# Patient Record
Sex: FEMALE | Race: WHITE | NOT HISPANIC OR LATINO | Employment: FULL TIME | URBAN - METROPOLITAN AREA
[De-identification: names, ages, dates, MRNs, and addresses within clinical notes are randomized per-mention and may not be internally consistent; named-entity substitution may affect disease eponyms.]

---

## 2017-06-02 ENCOUNTER — TRANSFERRED RECORDS (OUTPATIENT)
Dept: HEALTH INFORMATION MANAGEMENT | Facility: CLINIC | Age: 57
End: 2017-06-02

## 2017-06-02 LAB
HPV ABSTRACT: NORMAL
PAP-ABSTRACT: NORMAL

## 2017-11-22 ENCOUNTER — TELEPHONE (OUTPATIENT)
Dept: OBGYN | Facility: CLINIC | Age: 57
End: 2017-11-22

## 2017-11-22 ENCOUNTER — OFFICE VISIT (OUTPATIENT)
Dept: OBGYN | Facility: CLINIC | Age: 57
End: 2017-11-22
Payer: COMMERCIAL

## 2017-11-22 VITALS
HEART RATE: 81 BPM | BODY MASS INDEX: 22.03 KG/M2 | DIASTOLIC BLOOD PRESSURE: 70 MMHG | HEIGHT: 60 IN | SYSTOLIC BLOOD PRESSURE: 119 MMHG | WEIGHT: 112.2 LBS

## 2017-11-22 DIAGNOSIS — M25.552 PAIN IN JOINT INVOLVING PELVIC REGION AND THIGH, LEFT: Primary | ICD-10-CM

## 2017-11-22 PROCEDURE — 99203 OFFICE O/P NEW LOW 30 MIN: CPT | Performed by: OBSTETRICS & GYNECOLOGY

## 2017-11-22 RX ORDER — DULOXETIN HYDROCHLORIDE 20 MG/1
20 CAPSULE, DELAYED RELEASE ORAL 2 TIMES DAILY
Qty: 60 CAPSULE | Refills: 1 | Status: SHIPPED | OUTPATIENT
Start: 2017-11-22

## 2017-11-22 RX ORDER — DULOXETIN HYDROCHLORIDE 60 MG/1
60 CAPSULE, DELAYED RELEASE ORAL DAILY
Qty: 90 CAPSULE | Refills: 3 | Status: SHIPPED | OUTPATIENT
Start: 2017-12-22 | End: 2018-03-07
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
## 2017-11-22 NOTE — NURSING NOTE
Chief Complaint   Patient presents with     Consult     discuss prodendro nerve block - ref by Cee Strong (chiropractor)       Initial /70 (BP Location: Right arm, Patient Position: Chair, Cuff Size: Adult Regular)  Pulse 81  Ht 5' (1.524 m)  Wt 112 lb 3.2 oz (50.9 kg)  BMI 21.91 kg/m2 Estimated body mass index is 21.91 kg/(m^2) as calculated from the following:    Height as of this encounter: 5' (1.524 m).    Weight as of this encounter: 112 lb 3.2 oz (50.9 kg).  Medication Reconciliation: complete     Liang Cleary, CMA

## 2017-11-22 NOTE — MR AVS SNAPSHOT
"              After Visit Summary   11/22/2017    Jennifer Hurley    MRN: [id number]           Patient Information     Date Of Birth          1960        Visit Information        Provider Department      11/22/2017 9:30 AM Ave Zendejas MD [facility]        Today's Diagnoses     Pain in joint involving pelvic region and thigh, left    -  1       Follow-ups after your visit        Future tests that were ordered for you today     Open Future Orders        Priority Expected Expires Ordered    Fecal colorectal cancer screen (FIT) Routine 12/13/2017 2/14/2018 11/22/2017            Who to contact     If you have questions or need follow up information about today's clinic visit or your schedule please contact [facility] directly at [phone].  Normal or non-critical lab and imaging results will be communicated to you by MyChart, letter or phone within 4 business days after the clinic has received the results. If you do not hear from us within 7 days, please contact the clinic through MyChart or phone. If you have a critical or abnormal lab result, we will notify you by phone as soon as possible.  Submit refill requests through [facility] or call your pharmacy and they will forward the refill request to us. Please allow 3 business days for your refill to be completed.          Additional Information About Your Visit        MyChart Information     [facility] lets you send messages to your doctor, view your test results, renew your prescriptions, schedule appointments and more. To sign up, go to [email or web address] . Click on \"Log in\" on the left side of the screen, which will take you to the Welcome page. Then click on \"Sign up Now\" on the right side of the page.     You will be asked to enter the access code listed below, as well as some personal information. Please follow the directions to create your username and password.     Your access code is: [email or web address]  Expires: " 2018 10:17 AM     Your access code will  in 90 days. If you need help or a new code, please call your [facility] clinic or [phone].        Care EveryWhere ID     This is your Care EveryWhere ID. This could be used by other organizations to access your [facility] medical records  [id number]        Your Vitals Were     Pulse Height BMI (Body Mass Index)             81 5' (1.524 m) 21.91 kg/m2          Blood Pressure from Last 3 Encounters:   17 119/70   03 102/66   02 92/48    Weight from Last 3 Encounters:   17 112 lb 3.2 oz (50.9 kg)   03 112 lb (50.8 kg)   02 114 lb 8 oz (51.9 kg)                 Today's Medication Changes          These changes are accurate as of: 17 10:17 AM.  If you have any questions, ask your nurse or doctor.               Start taking these medicines.        Dose/Directions    * DULoxetine 20 MG EC capsule   Commonly known as:  CYMBALTA   Used for:  Pain in joint involving pelvic region and thigh, left   Started by:  Ave Zendejas MD        Dose:  20 mg   Take 1 capsule (20 mg) by mouth 2 times daily   Quantity:  60 capsule   Refills:  1       * DULoxetine 60 MG EC capsule   Commonly known as:  CYMBALTA   Used for:  Pain in joint involving pelvic region and thigh, left   Started by:  Ave Zendejas MD        Dose:  60 mg   Start taking on:  2017   Take 1 capsule (60 mg) by mouth daily   Quantity:  90 capsule   Refills:  3       * Notice:  This list has 2 medication(s) that are the same as other medications prescribed for you. Read the directions carefully, and ask your doctor or other care provider to review them with you.         Where to get your medicines      These medications were sent to [facility] [address] [place] [address] [address] [facility]  [address] [facility] [address]     Phone:  [phone]     DULoxetine 20 MG EC capsule    DULoxetine 60 MG EC capsule                Primary  Care Provider    ONCOLOGY       No address on file        Equal Access to Services     SHERRONKANE ALICE : Hadii vee ku naye Jason, wagautamda luqadaha, qalina kaalmamiguel omerdineshmiguel, waxsai warner maynardfredyjany mckeon. So [facility] [phone].    ATENCIÓN: Si habla español, tiene a de la o disposición servicios gratuitos de asistencia lingüística. Llame al [phone].    We comply with applicable federal civil rights laws and [place] laws. We do not discriminate on the basis of race, color, national origin, age, disability, sex, sexual orientation, or gender identity.            Thank you!     Thank you for choosing [facility]  for your care. Our goal is always to provide you with excellent care. Hearing back from our patients is one way we can continue to improve our services. Please take a few minutes to complete the written survey that you may receive in the mail after your visit with us. Thank you!             Your Updated Medication List - Protect others around you: Learn how to safely use, store and throw away your medicines at www.disposemymeds.org.          This list is accurate as of: 11/22/17 10:17 AM.  Always use your most recent med list.                   Brand Name Dispense Instructions for use Diagnosis    * DULoxetine 20 MG EC capsule    CYMBALTA    60 capsule    Take 1 capsule (20 mg) by mouth 2 times daily    Pain in joint involving pelvic region and thigh, left       * DULoxetine 60 MG EC capsule   Start taking on:  12/22/2017    CYMBALTA    90 capsule    Take 1 capsule (60 mg) by mouth daily    Pain in joint involving pelvic region and thigh, left       MULTI-DAY Tabs      qd        PREMPRO PO           ROBITUSSIN A-C OR           TESSALON PERLES PO           UNABLE TO FIND      Ear drops        XANAX 0.25 MG tablet   Generic drug:  ALPRAZolam     15    1- 2 TAB PO TID (Three times per day) as needed for anxiety    Anxiety state, unspecified       * Notice:  This list has 2  medication(s) that are the same as other medications prescribed for you. Read the directions carefully, and ask your doctor or other care provider to review them with you.

## 2018-03-07 DIAGNOSIS — M25.552 PAIN IN JOINT INVOLVING PELVIC REGION AND THIGH, LEFT: ICD-10-CM

## 2018-03-07 RX ORDER — DULOXETIN HYDROCHLORIDE 60 MG/1
60 CAPSULE, DELAYED RELEASE ORAL DAILY
Qty: 90 CAPSULE | Refills: 3 | Status: SHIPPED | OUTPATIENT
Start: 2018-03-07

## 2018-03-07 NOTE — TELEPHONE ENCOUNTER
Jennifer returning call and reports that she has had a significant improvement in her pain since starting the Cymbalta.  She rated her pain a 50/10 prior to starting this medication, today reports that her pain is the worst in the morning which she rates it a 3/10 and later in the day it is 1-2 at times.  She would like one more refill and is hoping that she can go off of this med after this fill.      Felecia Corrales  [facility] RN

## 2018-03-07 NOTE — TELEPHONE ENCOUNTER
"Refill request received from [facility] for Cymbalta. Patient's last office visit with Dr. Zendejas was 11/22/17.     Per that visit:  PLAN:  I discussed with Jennifer, that I cannot locate any areas of tenderness along the pelvic floor or region of Pudendal nerve so I do not believe she is experiencing pudendal neuropathy.  I discussed a trial of Cymbalta to see if can modulate nerve signals; RX given  Ave Zendejas MD  [facility]    Called patient to discuss how pain is doing and how patient is handling medication. Medication noted as \"trial\". Will need to route to provider for advisement on refill following call back.     Alex ROWAN   [facility] RN   "

## 2018-11-26 ENCOUNTER — TELEPHONE (OUTPATIENT)
Dept: OBGYN | Facility: CLINIC | Age: 58
End: 2018-11-26

## 2018-11-26 NOTE — LETTER
November 26, 2018      Jennifer REED LEONEL Xavi  [address] [facility] [address]    Dear MsLuisXavi,      This letter is to remind you that you are due for your mammogram.  Please call [phone] to schedule your appointment at your earliest convenience.     If you have completed the tests outside of [place], please have the results forwarded to our office. We will update the chart for your primary Physician to review before your next annual physical.     Sincerely,      Ave Zendejas MD

## 2018-11-26 NOTE — TELEPHONE ENCOUNTER
Panel Management Review    Date of last visit with a [place] provider: mericle   on 11/22/17  .  Date of next visit with a [place] provider: None.    Health Maintenance List    Health Maintenance   Topic Date Due     PHQ-2 Q1 YR  10/06/1970     HIV SCREEN (SYSTEM ASSIGNED)  10/06/1976     HEPATITIS C SCREENING  10/06/1976     TETANUS IMMUNIZATION (SYSTEM ASSIGNED)  01/13/2007     MAMMO SCREEN Q2 YR (SYSTEM ASSIGNED)  10/06/2008     COLON CANCER SCREEN (SYSTEM ASSIGNED)  10/06/2008     LIPID SCREEN Q5 YR FEMALE (SYSTEM ASSIGNED)  11/20/2008     ADVANCE DIRECTIVE PLANNING Q5 YRS  10/06/2013     INFLUENZA VACCINE (1) 09/01/2018     PAP SCREENING Q3 YR (SYSTEM ASSIGNED)  06/02/2020       Composite cancer screening  Chart review shows that this patient is due/due soon for the following Mammogram  No results found for: PAP  Past Surgical History:   Procedure Laterality Date     C APPENDECTOMY  1974     HC ENLARGE BREAST WITH IMPLANT  1985    silicone     HC REMOVAL OF OVARIAN CYST(S)  1976, 1989     LIGATION OF HEMORRHOID(S)  1980       Is hysterectomy listed in surgical history? No   Is mastectomy listed in surgical history? No     Summary:    Patient is due/failing the following:   Mammogram    Action needed: Patient needs office visit for mammogram.    Type of outreach:  Sent letter.      Staff Signature:  Margareth Sellers

## 2020-07-06 ENCOUNTER — OFFICE VISIT (OUTPATIENT)
Dept: DERMATOLOGY | Facility: CLINIC | Age: 60
End: 2020-07-06
Payer: COMMERCIAL

## 2020-07-06 VITALS — DIASTOLIC BLOOD PRESSURE: 75 MMHG | HEART RATE: 78 BPM | OXYGEN SATURATION: 98 % | SYSTOLIC BLOOD PRESSURE: 142 MMHG

## 2020-07-06 DIAGNOSIS — D48.5 NEOPLASM OF UNCERTAIN BEHAVIOR OF SKIN: Primary | ICD-10-CM

## 2020-07-06 DIAGNOSIS — L57.0 AK (ACTINIC KERATOSIS): ICD-10-CM

## 2020-07-06 DIAGNOSIS — D23.9 DERMAL NEVUS: ICD-10-CM

## 2020-07-06 DIAGNOSIS — D18.01 ANGIOMA OF SKIN: ICD-10-CM

## 2020-07-06 DIAGNOSIS — L82.1 SEBORRHEIC KERATOSIS: ICD-10-CM

## 2020-07-06 DIAGNOSIS — L81.4 LENTIGO: ICD-10-CM

## 2020-07-06 PROCEDURE — 99204 OFFICE O/P NEW MOD 45 MIN: CPT | Mod: 25 | Performed by: DERMATOLOGY

## 2020-07-06 PROCEDURE — 11102 TANGNTL BX SKIN SINGLE LES: CPT | Performed by: DERMATOLOGY

## 2020-07-06 PROCEDURE — 88305 TISSUE EXAM BY PATHOLOGIST: CPT | Mod: TC | Performed by: DERMATOLOGY

## 2020-07-06 RX ORDER — CALCIPOTRIENE 50 UG/G
CREAM TOPICAL
Qty: 60 G | Refills: 3 | Status: SHIPPED | OUTPATIENT
Start: 2020-07-06

## 2020-07-06 RX ORDER — FLUOROURACIL 50 MG/G
CREAM TOPICAL
Qty: 40 G | Refills: 1 | Status: SHIPPED | OUTPATIENT
Start: 2020-07-06

## 2020-07-06 NOTE — PROGRESS NOTES
Jennifer Hurley is a 59 year old year old female patient here today for spots on chest.   .  Patient states this has been present for a while.  Patient reports the following symptoms:  scale.  Patient reports the following previous treatments none.  These treatments did not work.  Patient reports the following modifying factors none.  Associated symptoms: none.  Patient has no other skin complaints today.  Remainder of the HPI, Meds, PMH, Allergies, FH, and SH was reviewed in chart.      Past Medical History:   Diagnosis Date     Unspecified hemorrhoids without mention of complication        Past Surgical History:   Procedure Laterality Date     C APPENDECTOMY  1974     HC ENLARGE BREAST WITH IMPLANT  1985    silicone     HC REMOVAL OF OVARIAN CYST(S)  1976, 1989     LIGATION OF HEMORRHOID(S)  1980        Family History   Problem Relation Age of Onset     Thyroid Disease Mother      Hypertension Father      Circulatory Father      Cancer Maternal Grandmother         liver cancer     Heart Disease Paternal Grandmother         MI     Cerebrovascular Disease Paternal Grandmother         mini-strokes     Cerebrovascular Disease Maternal Grandfather         mini-strokes     Heart Disease Paternal Grandfather        Social History     Socioeconomic History     Marital status:      Spouse name: Filiberto     Number of children: 2     Years of education: 14     Highest education level: Not on file   Occupational History     Occupation:      Employer: [employer],[address] [place] [address]   Social Needs     Financial resource strain: Not on file     Food insecurity     Worry: Not on file     Inability: Not on file     Transportation needs     Medical: Not on file     Non-medical: Not on file   Tobacco Use     Smoking status: Never Smoker     Smokeless tobacco: Never Used   Substance and Sexual Activity     Alcohol use: Yes     Comment: occasional wkend drink     Drug use: Not on file     Sexual  activity: Not on file   Lifestyle     Physical activity     Days per week: Not on file     Minutes per session: Not on file     Stress: Not on file   Relationships     Social connections     Talks on phone: Not on file     Gets together: Not on file     Attends Mu-ism service: Not on file     Active member of club or organization: Not on file     Attends meetings of clubs or organizations: Not on file     Relationship status: Not on file     Intimate partner violence     Fear of current or ex partner: Not on file     Emotionally abused: Not on file     Physically abused: Not on file     Forced sexual activity: Not on file   Other Topics Concern      Service No     Blood Transfusions No     Caffeine Concern No     Occupational Exposure Yes     Hobby Hazards Yes     Sleep Concern Yes     Stress Concern Yes     Weight Concern No     Special Diet No     Back Care No     Exercise Yes     Comment: 5 x weekly     Bike Helmet No     Seat Belt Yes     Self-Exams Yes     Comment: occ     Parent/sibling w/ CABG, MI or angioplasty before 65F 55M? Not Asked   Social History Narrative     Not on file       Outpatient Encounter Medications as of 7/6/2020   Medication Sig Dispense Refill     MULTI-DAY TABS   OR qd       Benzonatate (TESSALON PERLES PO)        Conj Estrog-Medroxyprogest Ace (PREMPRO PO)        DULoxetine (CYMBALTA) 20 MG EC capsule Take 1 capsule (20 mg) by mouth 2 times daily (Patient not taking: Reported on 7/6/2020) 60 capsule 1     DULoxetine (CYMBALTA) 60 MG EC capsule Take 1 capsule (60 mg) by mouth daily (Patient not taking: Reported on 7/6/2020) 90 capsule 3     Guaifenesin-Codeine (ROBITUSSIN A-C OR)        UNABLE TO FIND Ear drops       XANAX 0.25 MG OR TABS 1- 2 TAB PO TID (Three times per day) as needed for anxiety (Patient not taking: No sig reported) 15 0     No facility-administered encounter medications on file as of 7/6/2020.              Review Of Systems  Skin: As above  Eyes:  negative  Ears/Nose/Throat: negative  Respiratory: No shortness of breath, dyspnea on exertion, cough, or hemoptysis  Cardiovascular: negative  Gastrointestinal: negative  Genitourinary: negative  Musculoskeletal: negative  Neurologic: negative  Psychiatric: negative  Hematologic/Lymphatic/Immunologic: negative  Endocrine: negative      O:   NAD, WDWN, Alert & Oriented, Mood & Affect wnl, Vitals stable   Here today alone   BP (!) 142/75   Pulse 78   SpO2 98%    General appearance normal   Vitals stable   Alert, oriented and in no acute distress      Following lymph nodes palpated: Occipital, Cervical, Supraclavicular no lad   L upper back black papule   Chest gritty papules      Stuck on papules and brown macules on trunk and ext   Red papules on trunk  Flesh colored papules on trunk     The remainder of the full exam was normal; the following areas were examined:  conjunctiva/lids, oral mucosa, neck, peripheral vascular system, abdomen, lymph nodes, digits/nails, eccrine and apocrine glands, scalp/hair, face, neck, chest, abdomen, buttocks, back, RUE, LUE, RLE, LLE       Eyes: Conjunctivae/lids:Normal     ENT: Lips, buccal mucosa, tongue: normal    MSK:Normal    Cardiovascular: peripheral edema none    Pulm: Breathing Normal    Lymph Nodes: No Head and Neck Lymphadenopathy     Neuro/Psych: Orientation:Alert and Orientedx3 ; Mood/Affect:normal       A/P:  1. Seborrheic keratosis, lentigo, angioma, dermal nevus  2. L upper back r/o atypical nevus  TANGENTIAL BIOPSY SENT OUT:  After consent, anesthesia with LEC and prep, tangential excision performed and specimen sent out for permanent section histology.  No complications and routine wound care. Patient told to call our office in 1-2 weeks for result.       3. Chest actinic keratosis   Pathophysiology discussed with pateint   Using 5-Flurouracil Cream    5-Fluorouracil (5FU) topical cream (brand names Efudex, Carac) is a prescription topical medicine to treat  actinic keratoses (pre-squamous cell skin cancer lesions), sun-damaged skin as well as superficial skin cancers.    When applied the areas of sun-damaged skin, the 5FU will  find  damaged skin cells and destroy them.   During treatment, the skin will become red and look very irritated. This is the expected  normal response,  Some patients using 5FU show minimal redness and scaling while others have a very  vigorous  response where the skin scabs and peels. The important thing to realize is that 5FU is treating sun-damaged skin that carries skin cancer risk.      While the skin is irritated, open, sore or scabbed you can apply aquaphor, vaseline or 1% hydrocortisone cream in the morning.     You should apply a thin layer of the cream to the affected area twice a day for 2  weeks every night. A strip of cream the length of your finger tip should be enough to cover your entire face.  For tougher skin like arms, legs, or back, we may suggest longer treatment plans.  However if you react really strong and fast, you might stop earlier or use less frequently. - please call if it is very strong and you are concern you might need to stop early.     If you prescription coverage allows we may add calcipotriene (Dovonex ), a vitamin-D derivative, to the treatment plan.  In these cases we will have you mix the calcipotriene with the efudex to help shorten the treatment course and improve outcomes.      Typically very strong reactions are related to lots of underlying sun damage, and this means you are getting a good response to the medication. However, there is no need to be miserable while using this. Please let us know if you are having trouble or concerns!     BENIGN LESIONS DISCUSSED WITH PATIENT:  I discussed the specifics of tumor, prognosis, and genetics of benign lesions.  I explained that treatment of these lesions would be purely cosmetic and not medically neccessary.  I discussed with patient different removal options  including excision, cautery and /or laser.      Nature and genetics of benign skin lesions dicussed with patient.  Signs and Symptoms of skin cancer discussed with patient.  Patient encouraged to perform monthly skin exams.  UV precautions reviewed with patient.  Skin care regimen reviewed with patient: Eliminate harsh soaps, i.e. Dial, zest, irsih spring; Mild soaps such as Cetaphil or Dove sensitive skin, avoid hot or cold showers, aggressive use of emollients including vanicream, cetaphil or cerave discussed with patient.    Risks of non-melanoma skin cancer discussed with patient   Return to clinic 6 months

## 2020-07-06 NOTE — PATIENT INSTRUCTIONS
Wound Care Instructions     FOR SUPERFICIAL WOUNDS     [facility] [phone]    [facility] [phone]                       AFTER 24 HOURS YOU SHOULD REMOVE THE BANDAGE AND BEGIN DAILY DRESSING CHANGES AS FOLLOWS:     1) Remove Dressing.     2) Clean and dry the area with tap water using a Q-tip or sterile gauze pad.     3) Apply Vaseline, Aquaphor, Polysporin ointment or Bacitracin ointment over entire wound.  Do NOT use Neosporin ointment.     4) Cover the wound with a band-aid, or a sterile non-stick gauze pad and micropore paper tape      REPEAT THESE INSTRUCTIONS AT LEAST ONCE A DAY UNTIL THE WOUND HAS COMPLETELY HEALED.    It is an old wives tale that a wound heals better when it is exposed to air and allowed to dry out. The wound will heal faster with a better cosmetic result if it is kept moist with ointment and covered with a bandage.    **Do not let the wound dry out.**      Supplies Needed:      *Cotton tipped applicators (Q-tips)    *Polysporin Ointment or Bacitracin Ointment (NOT NEOSPORIN)    *Band-aids or non-stick gauze pads and micropore paper tape.      PATIENT INFORMATION:    During the healing process you will notice a number of changes. All wounds develop a small halo of redness surrounding the wound.  This means healing is occurring. Severe itching with extensive redness usually indicates sensitivity to the ointment or bandage tape used to dress the wound.  You should call our office if this develops.      Swelling  and/or discoloration around your surgical site is common, particularly when performed around the eye.    All wounds normally drain.  The larger the wound the more drainage there will be.  After 7-10 days, you will notice the wound beginning to shrink and new skin will begin to grow.  The wound is healed when you can see skin has formed over the entire area.  A healed wound has a healthy, shiny look to the surface and is red to dark pink in color  to normalize.  Wounds may take approximately 4-6 weeks to heal.  Larger wounds may take 6-8 weeks.  After the wound is healed you may discontinue dressing changes.    You may experience a sensation of tightness as your wound heals. This is normal and will gradually subside.    Your healed wound may be sensitive to temperature changes. This sensitivity improves with time, but if you re having a lot of discomfort, try to avoid temperature extremes.    Patients frequently experience itching after their wound appears to have healed because of the continue healing under the skin.  Plain Vaseline will help relieve the itching.        POSSIBLE COMPLICATIONS    BLEEDIN. Leave the bandage in place.  2. Use tightly rolled up gauze or a cloth to apply direct pressure over the bandage for 30  minutes.  3. Reapply pressure for an additional 30 minutes if necessary  4. Use additional gauze and tape to maintain pressure once the bleeding has stopped.    Using 5-Flurouracil Cream    5-Fluorouracil (5FU) topical cream (brand names Efudex, Carac) is a prescription topical medicine to treat actinic keratoses (pre-squamous cell skin cancer lesions), sun-damaged skin as well as superficial skin cancers.    When applied the areas of sun-damaged skin, the 5FU will  find  damaged skin cells and destroy them.   During treatment, the skin will become red and look very irritated. This is the expected  normal response,  Some patients using 5FU show minimal redness and scaling while others have a very  vigorous  response where the skin scabs and peels. The important thing to realize is that 5FU is treating sun-damaged skin that carries skin cancer risk.      While the skin is irritated, open, sore or scabbed you can apply aquaphor, vaseline or 1% hydrocortisone cream in the morning.     You should apply a thin layer of the cream to the affected area twice a day for 2  weeks every night. A strip of cream the length of your finger tip  should be enough to cover your entire face.  For tougher skin like arms, legs, or back, we may suggest longer treatment plans.  However if you react really strong and fast, you might stop earlier or use less frequently. - please call if it is very strong and you are concern you might need to stop early.     If you prescription coverage allows we may add calcipotriene (Dovonex ), a vitamin-D derivative, to the treatment plan.  In these cases we will have you mix the calcipotriene with the efudex to help shorten the treatment course and improve outcomes.      Typically very strong reactions are related to lots of underlying sun damage, and this means you are getting a good response to the medication. However, there is no need to be miserable while using this. Please let us know if you are having trouble or concerns!

## 2020-07-06 NOTE — LETTER
7/6/2020         RE: Jennifer Hurley  [address]  [facility] [address]        Dear Colleague,    Thank you for referring your patient, Jennifer Hurley, to the [facility]. Please see a copy of my visit note below.    Jennifer Hurley is a 59 year old year old female patient here today for spots on chest.   .  Patient states this has been present for a while.  Patient reports the following symptoms:  scale.  Patient reports the following previous treatments none.  These treatments did not work.  Patient reports the following modifying factors none.  Associated symptoms: none.  Patient has no other skin complaints today.  Remainder of the HPI, Meds, PMH, Allergies, FH, and SH was reviewed in chart.      Past Medical History:   Diagnosis Date     Unspecified hemorrhoids without mention of complication        Past Surgical History:   Procedure Laterality Date     C APPENDECTOMY  1974     HC ENLARGE BREAST WITH IMPLANT  1985    silicone     HC REMOVAL OF OVARIAN CYST(S)  1976, 1989     LIGATION OF HEMORRHOID(S)  1980        Family History   Problem Relation Age of Onset     Thyroid Disease Mother      Hypertension Father      Circulatory Father      Cancer Maternal Grandmother         liver cancer     Heart Disease Paternal Grandmother         MI     Cerebrovascular Disease Paternal Grandmother         mini-strokes     Cerebrovascular Disease Maternal Grandfather         mini-strokes     Heart Disease Paternal Grandfather        Social History     Socioeconomic History     Marital status:      Spouse name: Filiberto     Number of children: 2     Years of education: 14     Highest education level: Not on file   Occupational History     Occupation:      Employer: [employer],[address] [place] [address]   Social Needs     Financial resource strain: Not on file     Food insecurity     Worry: Not on file     Inability: Not on file     Transportation needs     Medical: Not  on file     Non-medical: Not on file   Tobacco Use     Smoking status: Never Smoker     Smokeless tobacco: Never Used   Substance and Sexual Activity     Alcohol use: Yes     Comment: occasional wkend drink     Drug use: Not on file     Sexual activity: Not on file   Lifestyle     Physical activity     Days per week: Not on file     Minutes per session: Not on file     Stress: Not on file   Relationships     Social connections     Talks on phone: Not on file     Gets together: Not on file     Attends Roman [employer] service: Not on file     Active member of club or organization: Not on file     Attends meetings of clubs or organizations: Not on file     Relationship status: Not on file     Intimate partner violence     Fear of current or ex partner: Not on file     Emotionally abused: Not on file     Physically abused: Not on file     Forced sexual activity: Not on file   Other Topics Concern      Service No     Blood Transfusions No     Caffeine Concern No     Occupational Exposure Yes     Hobby Hazards Yes     Sleep Concern Yes     Stress Concern Yes     Weight Concern No     Special Diet No     Back Care No     Exercise Yes     Comment: 5 x weekly     Bike Helmet No     Seat Belt Yes     Self-Exams Yes     Comment: occ     Parent/sibling w/ CABG, MI or angioplasty before 65F 55M? Not Asked   Social History Narrative     Not on file       Outpatient Encounter Medications as of 7/6/2020   Medication Sig Dispense Refill     MULTI-DAY TABS   OR qd       Benzonatate (TESSALON PERLES PO)        Conj Estrog-Medroxyprogest Ace (PREMPRO PO)        DULoxetine (CYMBALTA) 20 MG EC capsule Take 1 capsule (20 mg) by mouth 2 times daily (Patient not taking: Reported on 7/6/2020) 60 capsule 1     DULoxetine (CYMBALTA) 60 MG EC capsule Take 1 capsule (60 mg) by mouth daily (Patient not taking: Reported on 7/6/2020) 90 capsule 3     Guaifenesin-Codeine (ROBITUSSIN A-C OR)        UNABLE TO FIND Ear drops       XANAX 0.25 MG OR  TABS 1- 2 TAB PO TID (Three times per day) as needed for anxiety (Patient not taking: No sig reported) 15 0     No facility-administered encounter medications on file as of 7/6/2020.              Review Of Systems  Skin: As above  Eyes: negative  Ears/Nose/Throat: negative  Respiratory: No shortness of breath, dyspnea on exertion, cough, or hemoptysis  Cardiovascular: negative  Gastrointestinal: negative  Genitourinary: negative  Musculoskeletal: negative  Neurologic: negative  Psychiatric: negative  Hematologic/Lymphatic/Immunologic: negative  Endocrine: negative      O:   NAD, WDWN, Alert & Oriented, Mood & Affect wnl, Vitals stable   Here today alone   BP (!) 142/75   Pulse 78   SpO2 98%    General appearance normal   Vitals stable   Alert, oriented and in no acute distress      Following lymph nodes palpated: Occipital, Cervical, Supraclavicular no lad   L upper back black papule   Chest gritty papules      Stuck on papules and brown macules on trunk and ext   Red papules on trunk  Flesh colored papules on trunk     The remainder of the full exam was normal; the following areas were examined:  conjunctiva/lids, oral mucosa, neck, peripheral vascular system, abdomen, lymph nodes, digits/nails, eccrine and apocrine glands, scalp/hair, face, neck, chest, abdomen, buttocks, back, RUE, LUE, RLE, LLE       Eyes: Conjunctivae/lids:Normal     ENT: Lips, buccal mucosa, tongue: normal    MSK:Normal    Cardiovascular: peripheral edema none    Pulm: Breathing Normal    Lymph Nodes: No Head and Neck Lymphadenopathy     Neuro/Psych: Orientation:Alert and Orientedx3 ; Mood/Affect:normal       A/P:  1. Seborrheic keratosis, lentigo, angioma, dermal nevus  2. L upper back r/o atypical nevus  TANGENTIAL BIOPSY SENT OUT:  After consent, anesthesia with LEC and prep, tangential excision performed and specimen sent out for permanent section histology.  No complications and routine wound care. Patient told to call our office in 1-2  weeks for result.       3. Chest actinic keratosis   Pathophysiology discussed with pateint   Using 5-Flurouracil Cream    5-Fluorouracil (5FU) topical cream (brand names Efudex, Carac) is a prescription topical medicine to treat actinic keratoses (pre-squamous cell skin cancer lesions), sun-damaged skin as well as superficial skin cancers.    When applied the areas of sun-damaged skin, the 5FU will  find  damaged skin cells and destroy them.   During treatment, the skin will become red and look very irritated. This is the expected  normal response,  Some patients using 5FU show minimal redness and scaling while others have a very  vigorous  response where the skin scabs and peels. The important thing to realize is that 5FU is treating sun-damaged skin that carries skin cancer risk.      While the skin is irritated, open, sore or scabbed you can apply aquaphor, vaseline or 1% hydrocortisone cream in the morning.     You should apply a thin layer of the cream to the affected area twice a day for 2  weeks every night. A strip of cream the length of your finger tip should be enough to cover your entire face.  For tougher skin like arms, legs, or back, we may suggest longer treatment plans.  However if you react really strong and fast, you might stop earlier or use less frequently. - please call if it is very strong and you are concern you might need to stop early.     If you prescription coverage allows we may add calcipotriene (Dovonex ), a vitamin-D derivative, to the treatment plan.  In these cases we will have you mix the calcipotriene with the efudex to help shorten the treatment course and improve outcomes.      Typically very strong reactions are related to lots of underlying sun damage, and this means you are getting a good response to the medication. However, there is no need to be miserable while using this. Please let us know if you are having trouble or concerns!     BENIGN LESIONS DISCUSSED WITH PATIENT:   I discussed the specifics of tumor, prognosis, and genetics of benign lesions.  I explained that treatment of these lesions would be purely cosmetic and not medically neccessary.  I discussed with patient different removal options including excision, cautery and /or laser.      Nature and genetics of benign skin lesions dicussed with patient.  Signs and Symptoms of skin cancer discussed with patient.  Patient encouraged to perform monthly skin exams.  UV precautions reviewed with patient.  Skin care regimen reviewed with patient: Eliminate harsh soaps, i.e. Dial, zest, irsih spring; Mild soaps such as Cetaphil or Dove sensitive skin, avoid hot or cold showers, aggressive use of emollients including vanicream, cetaphil or cerave discussed with patient.    Risks of non-melanoma skin cancer discussed with patient   Return to clinic 6 months      Again, thank you for allowing me to participate in the care of your patient.        Sincerely,        Justo Guillen MD

## 2020-07-09 ENCOUNTER — TELEPHONE (OUTPATIENT)
Dept: DERMATOLOGY | Facility: CLINIC | Age: 60
End: 2020-07-09

## 2020-07-09 NOTE — TELEPHONE ENCOUNTER
Patient returned call. Information given below. [facility] called and notified of patient using isrrael ROWAN RN   [facility] Clin[facility]

## 2020-07-09 NOTE — TELEPHONE ENCOUNTER
Called patient. LM    -can utilize [employer]. [facility]: $66.43 for effudex and 114.16 for dovonex   -[facility]: effudex 79.33 or dovonex 49.74    Or PDT.    Alex ROWAN RN   Specialty Clinics

## 2020-07-09 NOTE — TELEPHONE ENCOUNTER
I will offer patient goodrx as an option for her dovonex and effudex.   Would she be able to do PDT if cost is still too high?     Thank you,   Alex ROWAN RN   Special[facility] Clin[facility]

## 2020-07-09 NOTE — TELEPHONE ENCOUNTER
Reason for Call:  Other prescription    Detailed comments: [facility] faxed request stating dovonex and Fluorouracil are too expensive. Please change    Phone Number Patient can be reached at: Other phone number:  [phone]*    Best Time: any     Can we leave a detailed message on this number? YES    Call taken on 7/9/2020 at 8:42 AM by Leydi Maldonado

## 2020-07-09 NOTE — TELEPHONE ENCOUNTER
Pt calling stating creams are $500 after ins. Would like to know what other options are available?  Pt was wondering if it is to be used 2 x daily for 2 weeks? She has spoke to others who only have used it for a few days to 5 days at a time.     Please call    Leydi Maldonado  Specialty CSS

## 2020-07-11 LAB — COPATH REPORT: NORMAL

## 2020-07-15 NOTE — RESULT ENCOUNTER NOTE
Pt notified of below.  Pt reports understanding.  Pt does not have further questions or concerns.    Carol Barros   [facility]  RN

## 2021-01-13 ENCOUNTER — OFFICE VISIT (OUTPATIENT)
Dept: DERMATOLOGY | Facility: CLINIC | Age: 61
End: 2021-01-13
Payer: COMMERCIAL

## 2021-01-13 VITALS — SYSTOLIC BLOOD PRESSURE: 130 MMHG | HEART RATE: 82 BPM | DIASTOLIC BLOOD PRESSURE: 72 MMHG | OXYGEN SATURATION: 99 %

## 2021-01-13 DIAGNOSIS — Z87.2 HISTORY OF ACTINIC KERATOSES: Primary | ICD-10-CM

## 2021-01-13 DIAGNOSIS — L81.4 LENTIGO: ICD-10-CM

## 2021-01-13 PROCEDURE — 99212 OFFICE O/P EST SF 10 MIN: CPT | Performed by: DERMATOLOGY

## 2021-01-13 PROCEDURE — 96999 UNLISTED SPEC DERM SVC/PX: CPT | Performed by: DERMATOLOGY

## 2021-01-13 NOTE — LETTER
1/13/2021         RE: Jennifer Hurley  [address] [facility] [address]        Dear Colleague,    Thank you for referring your patient, Jennifer Hurley, to the [facility]. Please see a copy of my visit note below.    Jennifer Hurley is an extremely pleasant 60 year old year old female patient here today for recheck after efudex, did well chest clear.  She notes two spots on face, brown.   .   Patient states this has been present for a while.  Patient reports the following symptoms:  none.  Patient reports the following previous treatments none.  These treatments did not work.  Patient reports the following modifying factors none.  Associated symptoms: none.  Patient has no other skin complaints today.  Remainder of the HPI, Meds, PMH, Allergies, FH, and SH was reviewed in chart.      Past Medical History:   Diagnosis Date     Unspecified hemorrhoids without mention of complication        Past Surgical History:   Procedure Laterality Date     C APPENDECTOMY  1974     HC ENLARGE BREAST WITH IMPLANT  1985    silicone     HC REMOVAL OF OVARIAN CYST(S)  1976, 1989     LIGATION OF HEMORRHOID(S)  1980        Family History   Problem Relation Age of Onset     Thyroid Disease Mother      Hypertension Father      Circulatory Father      Cancer Maternal Grandmother         liver cancer     Heart Disease Paternal Grandmother         MI     Cerebrovascular Disease Paternal Grandmother         mini-strokes     Cerebrovascular Disease Maternal Grandfather         mini-strokes     Heart Disease Paternal Grandfather        Social History     Socioeconomic History     Marital status:      Spouse name: Filiberto     Number of children: 2     Years of education: 14     Highest education level: Not on file   Occupational History     Occupation:      Employer: [employer]   Social Needs     Financial resource strain: Not on file     Food  insecurity     Worry: Not on file     Inability: Not on file     Transportation needs     Medical: Not on file     Non-medical: Not on file   Tobacco Use     Smoking status: Never Smoker     Smokeless tobacco: Never Used   Substance and Sexual Activity     Alcohol use: Yes     Comment: occasional wkend drink     Drug use: Not on file     Sexual activity: Not on file   Lifestyle     Physical activity     Days per week: Not on file     Minutes per session: Not on file     Stress: Not on file   Relationships     Social connections     Talks on phone: Not on file     Gets together: Not on file     Attends Jewish service: Not on file     Active member of club or organization: Not on file     Attends meetings of clubs or organizations: Not on file     Relationship status: Not on file     Intimate partner violence     Fear of current or ex partner: Not on file     Emotionally abused: Not on file     Physically abused: Not on file     Forced sexual activity: Not on file   Other Topics Concern      Service No     Blood Transfusions No     Caffeine Concern No     Occupational Exposure Yes     Hobby Hazards Yes     Sleep Concern Yes     Stress Concern Yes     Weight Concern No     Special Diet No     Back Care No     Exercise Yes     Comment: 5 x weekly     Bike Helmet No     Seat Belt Yes     Self-Exams Yes     Comment: occ     Parent/sibling w/ CABG, MI or angioplasty before 65F 55M? Not Asked   Social History Narrative     Not on file       Outpatient Encounter Medications as of 1/13/2021   Medication Sig Dispense Refill     Benzonatate (TESSALON PERLES PO)        Conj Estrog-Medroxyprogest Ace (PREMPRO PO)        Guaifenesin-Codeine (ROBITUSSIN A-C OR)        MULTI-DAY TABS   OR qd       UNABLE TO FIND Ear drops       calcipotriene (DOVONEX) 0.005 % external cream Mix with efudex and apply twice daily to face for ten days (Patient not taking: Reported on 1/13/2021) 60 g 3     DULoxetine (CYMBALTA) 20 MG EC  capsule Take 1 capsule (20 mg) by mouth 2 times daily (Patient not taking: Reported on 7/6/2020) 60 capsule 1     DULoxetine (CYMBALTA) 60 MG EC capsule Take 1 capsule (60 mg) by mouth daily (Patient not taking: Reported on 7/6/2020) 90 capsule 3     fluorouracil (EFUDEX) 5 % external cream Mix with dovonex and apply twice daily to face for ten days (Patient not taking: Reported on 1/13/2021) 40 g 1     XANAX 0.25 MG OR TABS 1- 2 TAB PO TID (Three times per day) as needed for anxiety (Patient not taking: No sig reported) 15 0     No facility-administered encounter medications on file as of 1/13/2021.              Review Of Systems  Skin: As above  Eyes: negative  Ears/Nose/Throat: negative  Respiratory: No shortness of breath, dyspnea on exertion, cough, or hemoptysis  Cardiovascular: negative  Gastrointestinal: negative  Genitourinary: negative  Musculoskeletal: negative  Neurologic: negative  Psychiatric: negative  Hematologic/Lymphatic/Immunologic: negative  Endocrine: negative      O:   NAD, WDWN, Alert & Oriented, Mood & Affect wnl, Vitals stable   Here today alone   /72 (BP Location: Left arm, Patient Position: Sitting)   Pulse 82   SpO2 99%    General appearance normal   Vitals stable   Alert, oriented and in no acute distress     Chest clear  R and L temple brown macule        The remainder of expanded problem focused exam was normal; the following areas were examined:  conjunctiva/lids, face, neck, , chest, digits/nails,       Eyes: Conjunctivae/lids:Normal     ENT: Lips, buccal mucosa, tongue: normal    MSK:Normal    Cardiovascular: peripheral edema none    Pulm: Breathing Normal    Neuro/Psych: Orientation:Alert and Orientedx3 ; Mood/Affect:normal       A/P:  1. Hx of actinic keratosis clear  2. Lentigo  Cosmetic nature discussed with patient   Laser discussed with patient   Cryo discussed with patient possible recurrecne discussed with patient   LN2:  Treated with LN2 for 5s for 1-2 cycles.  Warned risks of blistering, pain, pigment change, scarring, and incomplete resolution.  Advised patient to return if lesions do not completely resolve.  Wound care sheet given.    It was a pleasure speaking to Jennifer Hurley today.  BENIGN LESIONS DISCUSSED WITH PATIENT:  I discussed the specifics of tumor, prognosis, and genetics of benign lesions.  I explained that treatment of these lesions would be purely cosmetic and not medically neccessary.  I discussed with patient different removal options including excision, cautery and /or laser.      Signs and Symptoms of skin cancer discussed with patient.  Patient encouraged to perform monthly skin exams.  UV precautions reviewed with patient.  Return to clinic 6 months        Again, thank you for allowing me to participate in the care of your patient.        Sincerely,        Justo Guillen MD

## 2021-01-13 NOTE — NURSING NOTE
Initial /72 (BP Location: Left arm, Patient Position: Sitting)   Pulse 82   SpO2 99%  Estimated body mass index is 21.91 kg/m  as calculated from the following:    Height as of 11/22/17: 1.524 m (5').    Weight as of 11/22/17: 50.9 kg (112 lb 3.2 oz). .

## 2021-01-13 NOTE — PROGRESS NOTES
Jennifer Hurley is an extremely pleasant 60 year old year old female patient here today for recheck after efudex, did well chest clear.  She notes two spots on face, brown.   .   Patient states this has been present for a while.  Patient reports the following symptoms:  none.  Patient reports the following previous treatments none.  These treatments did not work.  Patient reports the following modifying factors none.  Associated symptoms: none.  Patient has no other skin complaints today.  Remainder of the HPI, Meds, PMH, Allergies, FH, and SH was reviewed in chart.      Past Medical History:   Diagnosis Date     Unspecified hemorrhoids without mention of complication        Past Surgical History:   Procedure Laterality Date     C APPENDECTOMY  1974     HC ENLARGE BREAST WITH IMPLANT  1985    silicone     HC REMOVAL OF OVARIAN CYST(S)  1976, 1989     LIGATION OF HEMORRHOID(S)  1980        Family History   Problem Relation Age of Onset     Thyroid Disease Mother      Hypertension Father      Circulatory Father      Cancer Maternal Grandmother         liver cancer     Heart Disease Paternal Grandmother         MI     Cerebrovascular Disease Paternal Grandmother         mini-strokes     Cerebrovascular Disease Maternal Grandfather         mini-strokes     Heart Disease Paternal Grandfather        Social History     Socioeconomic History     Marital status:      Spouse name: Filiberto     Number of children: 2     Years of education: 14     Highest education level: Not on file   Occupational History     Occupation:      Employer: [employer],[address]   Social Needs     Financial resource strain: Not on file     Food insecurity     Worry: Not on file     Inability: Not on file     Transportation needs     Medical: Not on file     Non-medical: Not on file   Tobacco Use     Smoking status: Never Smoker     Smokeless tobacco: Never Used   Substance and Sexual Activity     Alcohol  use: Yes     Comment: occasional wkend drink     Drug use: Not on file     Sexual activity: Not on file   Lifestyle     Physical activity     Days per week: Not on file     Minutes per session: Not on file     Stress: Not on file   Relationships     Social connections     Talks on phone: Not on file     Gets together: Not on file     Attends Hoahaoism service: Not on file     Active member of club or organization: Not on file     Attends meetings of clubs or organizations: Not on file     Relationship status: Not on file     Intimate partner violence     Fear of current or ex partner: Not on file     Emotionally abused: Not on file     Physically abused: Not on file     Forced sexual activity: Not on file   Other Topics Concern      Service No     Blood Transfusions No     Caffeine Concern No     Occupational Exposure Yes     Hobby Hazards Yes     Sleep Concern Yes     Stress Concern Yes     Weight Concern No     Special Diet No     Back Care No     Exercise Yes     Comment: 5 x weekly     Bike Helmet No     Seat Belt Yes     Self-Exams Yes     Comment: occ     Parent/sibling w/ CABG, MI or angioplasty before 65F 55M? Not Asked   Social History Narrative     Not on file       Outpatient Encounter Medications as of 1/13/2021   Medication Sig Dispense Refill     Benzonatate (TESSALON PERLES PO)        Conj Estrog-Medroxyprogest Ace (PREMPRO PO)        Guaifenesin-Codeine (ROBITUSSIN A-C OR)        MULTI-DAY TABS   OR qd       UNABLE TO FIND Ear drops       calcipotriene (DOVONEX) 0.005 % external cream Mix with efudex and apply twice daily to face for ten days (Patient not taking: Reported on 1/13/2021) 60 g 3     DULoxetine (CYMBALTA) 20 MG EC capsule Take 1 capsule (20 mg) by mouth 2 times daily (Patient not taking: Reported on 7/6/2020) 60 capsule 1     DULoxetine (CYMBALTA) 60 MG EC capsule Take 1 capsule (60 mg) by mouth daily (Patient not taking: Reported on 7/6/2020) 90 capsule 3     fluorouracil  (EFUDEX) 5 % external cream Mix with dovonex and apply twice daily to face for ten days (Patient not taking: Reported on 1/13/2021) 40 g 1     XANAX 0.25 MG OR TABS 1- 2 TAB PO TID (Three times per day) as needed for anxiety (Patient not taking: No sig reported) 15 0     No facility-administered encounter medications on file as of 1/13/2021.              Review Of Systems  Skin: As above  Eyes: negative  Ears/Nose/Throat: negative  Respiratory: No shortness of breath, dyspnea on exertion, cough, or hemoptysis  Cardiovascular: negative  Gastrointestinal: negative  Genitourinary: negative  Musculoskeletal: negative  Neurologic: negative  Psychiatric: negative  Hematologic/Lymphatic/Immunologic: negative  Endocrine: negative      O:   NAD, WDWN, Alert & Oriented, Mood & Affect wnl, Vitals stable   Here today alone   /72 (BP Location: Left arm, Patient Position: Sitting)   Pulse 82   SpO2 99%    General appearance normal   Vitals stable   Alert, oriented and in no acute distress     Chest clear  R and L temple brown macule        The remainder of expanded problem focused exam was normal; the following areas were examined:  conjunctiva/lids, face, neck, , chest, digits/nails,       Eyes: Conjunctivae/lids:Normal     ENT: Lips, buccal mucosa, tongue: normal    MSK:Normal    Cardiovascular: peripheral edema none    Pulm: Breathing Normal    Neuro/Psych: Orientation:Alert and Orientedx3 ; Mood/Affect:normal       A/P:  1. Hx of actinic keratosis clear  2. Lentigo  Cosmetic nature discussed with patient   Laser discussed with patient   Cryo discussed with patient possible recurrecne discussed with patient   LN2:  Treated with LN2 for 5s for 1-2 cycles. Warned risks of blistering, pain, pigment change, scarring, and incomplete resolution.  Advised patient to return if lesions do not completely resolve.  Wound care sheet given.    It was a pleasure speaking to Jennifer Hurley today.  BENIGN LESIONS DISCUSSED WITH  PATIENT:  I discussed the specifics of tumor, prognosis, and genetics of benign lesions.  I explained that treatment of these lesions would be purely cosmetic and not medically neccessary.  I discussed with patient different removal options including excision, cautery and /or laser.      Signs and Symptoms of skin cancer discussed with patient.  Patient encouraged to perform monthly skin exams.  UV precautions reviewed with patient.  Return to clinic 6 months

## 2022-02-12 ENCOUNTER — HEALTH MAINTENANCE LETTER (OUTPATIENT)
Age: 62
End: 2022-02-12

## 2022-03-02 ENCOUNTER — OFFICE VISIT (OUTPATIENT)
Dept: DERMATOLOGY | Facility: CLINIC | Age: 62
End: 2022-03-02

## 2022-03-02 VITALS — SYSTOLIC BLOOD PRESSURE: 135 MMHG | OXYGEN SATURATION: 96 % | DIASTOLIC BLOOD PRESSURE: 75 MMHG | HEART RATE: 91 BPM

## 2022-03-02 DIAGNOSIS — L81.4 LENTIGO: ICD-10-CM

## 2022-03-02 DIAGNOSIS — L57.0 AK (ACTINIC KERATOSIS): Primary | ICD-10-CM

## 2022-03-02 DIAGNOSIS — D18.01 ANGIOMA OF SKIN: ICD-10-CM

## 2022-03-02 DIAGNOSIS — L82.1 SEBORRHEIC KERATOSIS: ICD-10-CM

## 2022-03-02 PROCEDURE — 17000 DESTRUCT PREMALG LESION: CPT | Performed by: DERMATOLOGY

## 2022-03-02 PROCEDURE — 99213 OFFICE O/P EST LOW 20 MIN: CPT | Mod: 25 | Performed by: DERMATOLOGY

## 2022-03-02 NOTE — LETTER
3/2/2022         RE: Jennifer Hurley  [address] [id number]        Dear Colleague,    Thank you for referring your patient, Jennifer Hurley, to the [facility]. Please see a copy of my visit note below.    Jennifer Hurley is an extremely pleasant 61 year old year old female patient here today for spot on nose, face and chest.  HX of actinic keratosis s/p efudex on chest.   .   Patient states this has been present for a while.  Patient reports the following symptoms:  scale.  Patient reports the following previous treatments none.  These treatments did not work.  Patient reports the following modifying factors none.  Associated symptoms: none.  Patient has no other skin complaints today.  Remainder of the HPI, Meds, PMH, Allergies, FH, and SH was reviewed in chart.      Past Medical History:   Diagnosis Date     Unspecified hemorrhoids without mention of complication        Past Surgical History:   Procedure Laterality Date     HC ENLARGE BREAST WITH IMPLANT  1985    silicone     HC REMOVAL OF OVARIAN CYST(S)  1976, 1989     LIGATION OF HEMORRHOID(S)  1980     ZZC APPENDECTOMY  1974        Family History   Problem Relation Age of Onset     Thyroid Disease Mother      Hypertension Father      Circulatory Father      Cancer Maternal Grandmother         liver cancer     Heart Disease Paternal Grandmother         MI     Cerebrovascular Disease Paternal Grandmother         mini-strokes     Cerebrovascular Disease Maternal Grandfather         mini-strokes     Heart Disease Paternal Grandfather        Social History     Socioeconomic History     Marital status:      Spouse name: Filiberto     Number of children: 2     Years of education: 14     Highest education level: Not on file   Occupational History     Occupation:      Employer: [employer],[address]   Tobacco Use     Smoking status: Never Smoker     Smokeless tobacco: Never Used    Substance and Sexual Activity     Alcohol use: Yes     Comment: occasional wkend drink     Drug use: Not on file     Sexual activity: Not on file   Other Topics Concern      Service No     Blood Transfusions No     Caffeine Concern No     Occupational Exposure Yes     Hobby Hazards Yes     Sleep Concern Yes     Stress Concern Yes     Weight Concern No     Special Diet No     Back Care No     Exercise Yes     Comment: 5 x weekly     Bike Helmet No     Seat Belt Yes     Self-Exams Yes     Comment: occ     Parent/sibling w/ CABG, MI or angioplasty before 65F 55M? Not Asked   Social History Narrative     Not on file     Social Determinants of Health     Financial Resource Strain: Not on file   Food Insecurity: Not on file   Transportation Needs: Not on file   Physical Activity: Not on file   Stress: Not on file   Social Connections: Not on file   Intimate Partner Violence: Not on file   Housing Stability: Not on file       Outpatient Encounter Medications as of 3/2/2022   Medication Sig Dispense Refill     Benzonatate (TESSALON PERLES PO)        Guaifenesin-Codeine (ROBITUSSIN A-C OR)        MULTI-DAY TABS   OR qd       UNABLE TO FIND Ear drops       calcipotriene (DOVONEX) 0.005 % external cream Mix with efudex and apply twice daily to face for ten days (Patient not taking: Reported on 1/13/2021) 60 g 3     Conj Estrog-Medroxyprogest Ace (PREMPRO PO)  (Patient not taking: Reported on 3/2/2022)       DULoxetine (CYMBALTA) 20 MG EC capsule Take 1 capsule (20 mg) by mouth 2 times daily (Patient not taking: Reported on 7/6/2020) 60 capsule 1     DULoxetine (CYMBALTA) 60 MG EC capsule Take 1 capsule (60 mg) by mouth daily (Patient not taking: Reported on 7/6/2020) 90 capsule 3     fluorouracil (EFUDEX) 5 % external cream Mix with dovonex and apply twice daily to face for ten days (Patient not taking: Reported on 1/13/2021) 40 g 1     XANAX 0.25 MG OR TABS 1- 2 TAB PO TID (Three times per day) as needed for anxiety  (Patient not taking: No sig reported) 15 0     No facility-administered encounter medications on file as of 3/2/2022.             O:   NAD, WDWN, Alert & Oriented, Mood & Affect wnl, Vitals stable   Here today alone   /75 (BP Location: Left arm, Patient Position: Sitting, Cuff Size: Adult Regular)   Pulse 91   SpO2 96%    General appearance normal   Vitals stable   Alert, oriented and in no acute distress     R [place] spider telangiectasia    Stuck on papules and brown macules on trunk and ext   Brown macule son face  Gritty papule r chest  Eyes: Conjunctivae/lids:Normal     ENT: Lips, buccal mucosa, tongue: normal    MSK:Normal    Cardiovascular: peripheral edema none    Pulm: Breathing Normal    Neuro/Psych: Orientation:Alert and Orientedx3 ; Mood/Affect:normal       A/P:  1. Seborrheic keratosis, lentigo, angioma,   2. Chest actinic keratosis   LN2:  Treated with LN2 for 5s for 1-2 cycles. Warned risks of blistering, pain, pigment change, scarring, and incomplete resolution.  Advised patient to return if lesions do not completely resolve.  Wound care sheet given.  It was a pleasure speaking to Jennifer Hurley today.  Previous clinic notes and pertinent laboratory tests were reviewed prior to Jennifer Hurley's visit.  Nature and genetics of benign skin lesions dicussed with patient.  Signs and Symptoms of skin cancer discussed with patient.  Patient encouraged to perform monthly skin exams.  UV precautions reviewed with patient.  Return to clinic 6 months        Again, thank you for allowing me to participate in the care of your patient.        Sincerely,        Justo Guillen MD

## 2022-03-02 NOTE — PROGRESS NOTES
Jennifer Hurley is an extremely pleasant 61 year old year old female patient here today for spot on nose, face and chest.  HX of actinic keratosis s/p efudex on chest.   .   Patient states this has been present for a while.  Patient reports the following symptoms:  scale.  Patient reports the following previous treatments none.  These treatments did not work.  Patient reports the following modifying factors none.  Associated symptoms: none.  Patient has no other skin complaints today.  Remainder of the HPI, Meds, PMH, Allergies, FH, and SH was reviewed in chart.      Past Medical History:   Diagnosis Date     Unspecified hemorrhoids without mention of complication        Past Surgical History:   Procedure Laterality Date     HC ENLARGE BREAST WITH IMPLANT  1985    silicone     HC REMOVAL OF OVARIAN CYST(S)  1976, 1989     LIGATION OF HEMORRHOID(S)  1980     ZZC APPENDECTOMY  1974        Family History   Problem Relation Age of Onset     Thyroid Disease Mother      Hypertension Father      Circulatory Father      Cancer Maternal Grandmother         liver cancer     Heart Disease Paternal Grandmother         MI     Cerebrovascular Disease Paternal Grandmother         mini-strokes     Cerebrovascular Disease Maternal Grandfather         mini-strokes     Heart Disease Paternal Grandfather        Social History     Socioeconomic History     Marital status:      Spouse name: Filiberto     Number of children: 2     Years of education: 14     Highest education level: Not on file   Occupational History     Occupation:      Employer: [employer],[address]   Tobacco Use     Smoking status: Never Smoker     Smokeless tobacco: Never Used   Substance and Sexual Activity     Alcohol use: Yes     Comment: occasional wkend drink     Drug use: Not on file     Sexual activity: Not on file   Other Topics Concern      Service No     Blood Transfusions No     Caffeine Concern No      Occupational Exposure Yes     Hobby Hazards Yes     Sleep Concern Yes     Stress Concern Yes     Weight Concern No     Special Diet No     Back Care No     Exercise Yes     Comment: 5 x weekly     Bike Helmet No     Seat Belt Yes     Self-Exams Yes     Comment: occ     Parent/sibling w/ CABG, MI or angioplasty before 65F 55M? Not Asked   Social History Narrative     Not on file     Social Determinants of Health     Financial Resource Strain: Not on file   Food Insecurity: Not on file   Transportation Needs: Not on file   Physical Activity: Not on file   Stress: Not on file   Social Connections: Not on file   Intimate Partner Violence: Not on file   Housing Stability: Not on file       Outpatient Encounter Medications as of 3/2/2022   Medication Sig Dispense Refill     Benzonatate (TESSALON PERLES PO)        Guaifenesin-Codeine (ROBITUSSIN A-C OR)        MULTI-DAY TABS   OR qd       UNABLE TO FIND Ear drops       calcipotriene (DOVONEX) 0.005 % external cream Mix with efudex and apply twice daily to face for ten days (Patient not taking: Reported on 1/13/2021) 60 g 3     Conj Estrog-Medroxyprogest Ace (PREMPRO PO)  (Patient not taking: Reported on 3/2/2022)       DULoxetine (CYMBALTA) 20 MG EC capsule Take 1 capsule (20 mg) by mouth 2 times daily (Patient not taking: Reported on 7/6/2020) 60 capsule 1     DULoxetine (CYMBALTA) 60 MG EC capsule Take 1 capsule (60 mg) by mouth daily (Patient not taking: Reported on 7/6/2020) 90 capsule 3     fluorouracil (EFUDEX) 5 % external cream Mix with dovonex and apply twice daily to face for ten days (Patient not taking: Reported on 1/13/2021) 40 g 1     XANAX 0.25 MG OR TABS 1- 2 TAB PO TID (Three times per day) as needed for anxiety (Patient not taking: No sig reported) 15 0     No facility-administered encounter medications on file as of 3/2/2022.             O:   NAD, WDWN, Alert & Oriented, Mood & Affect wnl, Vitals stable   Here today alone   /75 (BP Location: Left  arm, Patient Position: Sitting, Cuff Size: Adult Regular)   Pulse 91   SpO2 96%    General appearance normal   Vitals stable   Alert, oriented and in no acute distress     R [place] spider telangiectasia    Stuck on papules and brown macules on trunk and ext   Brown macule son face  Gritty papule r chest  Eyes: Conjunctivae/lids:Normal     ENT: Lips, buccal mucosa, tongue: normal    MSK:Normal    Cardiovascular: peripheral edema none    Pulm: Breathing Normal    Neuro/Psych: Orientation:Alert and Orientedx3 ; Mood/Affect:normal       A/P:  1. Seborrheic keratosis, lentigo, angioma,   2. Chest actinic keratosis   LN2:  Treated with LN2 for 5s for 1-2 cycles. Warned risks of blistering, pain, pigment change, scarring, and incomplete resolution.  Advised patient to return if lesions do not completely resolve.  Wound care sheet given.  It was a pleasure speaking to Jennifer Hurley today.  Previous clinic notes and pertinent laboratory tests were reviewed prior to Jennifer Hurley's visit.  Nature and genetics of benign skin lesions dicussed with patient.  Signs and Symptoms of skin cancer discussed with patient.  Patient encouraged to perform monthly skin exams.  UV precautions reviewed with patient.  Return to clinic 6 months

## 2022-03-02 NOTE — NURSING NOTE
Chief Complaint   Patient presents with     Derm Problem     spots        Amanda Rascon on 3/2/2022 at 1:42 PM

## 2022-10-09 ENCOUNTER — HEALTH MAINTENANCE LETTER (OUTPATIENT)
Age: 62
End: 2022-10-09

## 2023-02-18 ENCOUNTER — HEALTH MAINTENANCE LETTER (OUTPATIENT)
Age: 63
End: 2023-02-18

## 2024-01-06 ENCOUNTER — HEALTH MAINTENANCE LETTER (OUTPATIENT)
Age: 64
End: 2024-01-06

## 2024-03-16 ENCOUNTER — HEALTH MAINTENANCE LETTER (OUTPATIENT)
Age: 64
End: 2024-03-16

## 2025-01-25 ENCOUNTER — HEALTH MAINTENANCE LETTER (OUTPATIENT)
Age: 65
End: 2025-01-25